# Patient Record
Sex: FEMALE | Race: WHITE | Employment: UNEMPLOYED | URBAN - NONMETROPOLITAN AREA
[De-identification: names, ages, dates, MRNs, and addresses within clinical notes are randomized per-mention and may not be internally consistent; named-entity substitution may affect disease eponyms.]

---

## 2023-07-27 ENCOUNTER — OFFICE VISIT (OUTPATIENT)
Dept: ONCOLOGY | Age: 23
End: 2023-07-27
Payer: COMMERCIAL

## 2023-07-27 ENCOUNTER — HOSPITAL ENCOUNTER (OUTPATIENT)
Dept: INFUSION THERAPY | Age: 23
Discharge: HOME | End: 2023-07-27
Payer: COMMERCIAL

## 2023-07-27 VITALS
HEART RATE: 60 BPM | OXYGEN SATURATION: 100 % | TEMPERATURE: 99.1 F | SYSTOLIC BLOOD PRESSURE: 110 MMHG | DIASTOLIC BLOOD PRESSURE: 68 MMHG | RESPIRATION RATE: 16 BRPM

## 2023-07-27 VITALS
TEMPERATURE: 99.1 F | HEART RATE: 60 BPM | BODY MASS INDEX: 26.55 KG/M2 | OXYGEN SATURATION: 100 % | HEIGHT: 61 IN | WEIGHT: 140.6 LBS | SYSTOLIC BLOOD PRESSURE: 110 MMHG | RESPIRATION RATE: 16 BRPM | DIASTOLIC BLOOD PRESSURE: 68 MMHG

## 2023-07-27 DIAGNOSIS — C73 FOLLICULAR CANCER OF THYROID (HCC): Primary | ICD-10-CM

## 2023-07-27 DIAGNOSIS — E03.2 IATROGENIC HYPOTHYROIDISM: ICD-10-CM

## 2023-07-27 PROCEDURE — 99204 OFFICE O/P NEW MOD 45 MIN: CPT | Performed by: INTERNAL MEDICINE

## 2023-07-27 PROCEDURE — G8419 CALC BMI OUT NRM PARAM NOF/U: HCPCS | Performed by: INTERNAL MEDICINE

## 2023-07-27 PROCEDURE — 1036F TOBACCO NON-USER: CPT | Performed by: INTERNAL MEDICINE

## 2023-07-27 PROCEDURE — G8427 DOCREV CUR MEDS BY ELIG CLIN: HCPCS | Performed by: INTERNAL MEDICINE

## 2023-07-27 PROCEDURE — 99211 OFF/OP EST MAY X REQ PHY/QHP: CPT

## 2023-07-27 RX ORDER — LEVOTHYROXINE SODIUM 0.1 MG/1
100 TABLET ORAL
COMMUNITY
Start: 2023-07-11

## 2023-07-27 ASSESSMENT — ENCOUNTER SYMPTOMS
APNEA: 0
COUGH: 0
CHOKING: 0
FACIAL SWELLING: 0
ANAL BLEEDING: 0
SHORTNESS OF BREATH: 0
BACK PAIN: 0
VOMITING: 0
STRIDOR: 0
EYE DISCHARGE: 0
ABDOMINAL PAIN: 0
RECTAL PAIN: 0
COLOR CHANGE: 0
CONSTIPATION: 0
CHEST TIGHTNESS: 0
TROUBLE SWALLOWING: 0
WHEEZING: 0
ABDOMINAL DISTENTION: 0
EYE PAIN: 0
SINUS PAIN: 0
BLOOD IN STOOL: 0
NAUSEA: 0
DIARRHEA: 0

## 2023-07-27 NOTE — PATIENT INSTRUCTIONS
ASSESSMENT/PLAN:    1: Diagnosis:       Thyroid Cancer s/p Lobectomy in 2021       Iatrogenic Hypothyroidism      2) Treatment goal:      Treatment plan:      LAB THIS MONTH      Thyroglobulin       Thyroglobulin antibody      TSH every 3 months      Ultra-sound of the neck-s/p thyroidectomy      Levo-thyroxine 100 mcg daily        3) Follow Up:Office appointment 3 months with Minor TABITHA Ledesma with TSH

## 2023-07-27 NOTE — PROGRESS NOTES
[address] [facility] [address]  Dept: [phone]  Loc: [phone]   Hematology/Oncology Consult (Clinic)        7/27/23     Ramos Reason   2000     Generic, [address]       Reason:   Chief Complaint   Patient presents with    New Patient     Thyroid Cancer          HPI:   The patient is a 72-year-old woman who moved to [facility] from the [facility] in the [facility] approximately 4 months ago. She first had surgery on her thyroid gland in March 2021 in [facility] in the [facility]. 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 She then had a second surgery done in [place] with radioactive iodine administration in July 2021. Since then she has been maintained on levothyroxine (Synthroid) 100 mcg daily. Fortunately she ran out of thyroid medication approximately 3 months ago and has been off thyroid replacement therapy until approximately 2 weeks ago. During this time her TSH increased to 140. Patient has a negative review of systems except for a recent 10 pound weight gain since stopping thyroid replacement therapy. She also has had some menorrhagia since stopping thyroid replacement therapy. She is here today to establish care and follow-up for her follicular carcinoma of the thyroid gland, status post complete thyroidectomy and radioactive iodine administration in July 2021. ROS:  Review of Systems   Constitutional:  Positive for unexpected weight change. Negative for appetite change, chills, diaphoresis, fatigue and fever. Patient has gained 10 pounds since running out of thyroid medication 3 months ago   HENT:  Negative for drooling, facial swelling, mouth sores, nosebleeds, sinus pain and trouble swallowing. Eyes:  Negative for pain, discharge and visual disturbance.    Respiratory:  Negative

## 2023-07-31 ENCOUNTER — SOCIAL WORK (OUTPATIENT)
Dept: INFUSION THERAPY | Age: 23
End: 2023-07-31

## 2023-08-01 NOTE — PROGRESS NOTES
Oncology Social Work    Date: 8/1/2023  Time: 11:14 AM  Name: Niall Mayo  MRN: [id number]     Contact Type: Follow-up    Note:   Situation: This staff called Niall Mayo via phone support to introduce myself as her Oncology Social Worker. Background:  Carlene Mayo had been provided a Distress Thermometer at her initial appointment at the [facility]. This staff was calling to establish results of her distress levels. Assessment: Although there were no reflected elevated concerns indicated on the Distress Thermometer other than fatigue and some bone pain, she did share her appreciation for my contact. - She shared that she continues to work full-time from [employer] daily and is unable to talk on her phone during that time.  - Education regarding the services provided by the SW were relayed on the message.  - A referral to the [facility] [address] was provided should she choose that option of support as well. Recommendation: Follow-up will be initiated by Carlenelaura Tejedajovan based on need.  provided her with my contact information and will remain available for support.         WILTON Bryant, DIANE, MARIO  Oncology Social Worker      Electronically signed by WILTON Bryant LSW, ACHP-SW on 8/1/2023 at 11:14 AM

## 2023-11-13 ENCOUNTER — OFFICE VISIT (OUTPATIENT)
Dept: ONCOLOGY | Age: 23
End: 2023-11-13
Payer: COMMERCIAL

## 2023-11-13 ENCOUNTER — HOSPITAL ENCOUNTER (OUTPATIENT)
Dept: INFUSION THERAPY | Age: 23
Discharge: HOME | End: 2023-11-13
Payer: COMMERCIAL

## 2023-11-13 VITALS
SYSTOLIC BLOOD PRESSURE: 113 MMHG | RESPIRATION RATE: 18 BRPM | HEIGHT: 61 IN | DIASTOLIC BLOOD PRESSURE: 67 MMHG | OXYGEN SATURATION: 100 % | HEART RATE: 67 BPM | BODY MASS INDEX: 27.83 KG/M2 | TEMPERATURE: 98.3 F | WEIGHT: 147.4 LBS

## 2023-11-13 VITALS
RESPIRATION RATE: 18 BRPM | TEMPERATURE: 98.3 F | HEART RATE: 67 BPM | SYSTOLIC BLOOD PRESSURE: 113 MMHG | OXYGEN SATURATION: 100 % | DIASTOLIC BLOOD PRESSURE: 67 MMHG

## 2023-11-13 DIAGNOSIS — C73 FOLLICULAR CANCER OF THYROID (HCC): Primary | ICD-10-CM

## 2023-11-13 DIAGNOSIS — D50.0 IRON DEFICIENCY ANEMIA DUE TO CHRONIC BLOOD LOSS: ICD-10-CM

## 2023-11-13 DIAGNOSIS — C73 FOLLICULAR CANCER OF THYROID (HCC): ICD-10-CM

## 2023-11-13 LAB
ALBUMIN SERPL BCG-MCNC: 4.4 G/DL (ref 3.5–5.1)
ALP SERPL-CCNC: 85 U/L (ref 38–126)
ALT SERPL W/O P-5'-P-CCNC: 22 U/L (ref 11–66)
AST SERPL-CCNC: 23 U/L (ref 5–40)
BASOPHILS # BLD AUTO: 0 THOU/MM3 (ref 0–0.1)
BASOPHILS NFR BLD AUTO: 1 % (ref 0–3)
BILIRUB CONJ SERPL-MCNC: < 0.2 MG/DL (ref 0–0.3)
BILIRUB SERPL-MCNC: 0.2 MG/DL (ref 0.3–1.2)
BUN BLDP-MCNC: 9 MG/DL (ref 8–26)
CHLORIDE BLD-SCNC: 106 MEQ/L (ref 98–109)
CREAT BLD-MCNC: 0.9 MG/DL (ref 0.5–1.2)
EOSINOPHIL # BLD AUTO: 0.2 THOU/MM3 (ref 0–0.4)
EOSINOPHIL NFR BLD AUTO: 4 % (ref 0–4)
ERYTHROCYTE [DISTWIDTH] IN BLOOD BY AUTOMATED COUNT: 13.2 % (ref 11.5–14.5)
GFR SERPL CREATININE-BSD FRML MDRD: > 60 ML/MIN/1.73M2
GLUCOSE BLD-MCNC: 69 MG/DL (ref 70–108)
HCT VFR BLD AUTO: 32 % (ref 37–47)
HGB BLD-MCNC: 10.3 GM/DL (ref 12–16)
IMM GRANULOCYTES # BLD AUTO: 0.03 THOU/MM3 (ref 0–0.07)
IMM GRANULOCYTES NFR BLD AUTO: 1 %
IONIZED CALCIUM, WHOLE BLOOD: 1.17 MMOL/L (ref 1.12–1.32)
LYMPHOCYTES # BLD AUTO: 2.2 THOU/MM3 (ref 1–4.8)
LYMPHOCYTES NFR BLD AUTO: 34 % (ref 15–47)
MCH RBC QN AUTO: 28.1 PG (ref 26–33)
MCHC RBC AUTO-ENTMCNC: 32.2 GM/DL (ref 32.2–35.5)
MCV RBC AUTO: 87 FL (ref 81–99)
MONOCYTES # BLD AUTO: 0.3 THOU/MM3 (ref 0.4–1.3)
MONOCYTES NFR BLD AUTO: 4 % (ref 0–12)
NEUTROPHILS NFR BLD AUTO: 57 % (ref 43–75)
PLATELET # BLD AUTO: 350 THOU/MM3 (ref 130–400)
PMV BLD AUTO: 8.9 FL (ref 9.4–12.4)
POTASSIUM BLD-SCNC: 3.8 MEQ/L (ref 3.5–4.9)
PROT SERPL-MCNC: 8.2 G/DL (ref 6.1–8)
RBC # BLD AUTO: 3.66 MILL/MM3 (ref 4.2–5.4)
SEGMENTED NEUTROPHILS ABSOLUTE COUNT: 3.7 THOU/MM3 (ref 1.8–7.7)
SODIUM BLD-SCNC: 141 MEQ/L (ref 138–146)
TOTAL CO2, WHOLE BLOOD: 25 MEQ/L (ref 23–33)
TSH SERPL DL<=0.005 MIU/L-ACNC: 118.7 UIU/ML (ref 0.4–4.2)
WBC # BLD AUTO: 6.5 THOU/MM3 (ref 4.8–10.8)

## 2023-11-13 PROCEDURE — 99211 OFF/OP EST MAY X REQ PHY/QHP: CPT

## 2023-11-13 PROCEDURE — 84443 ASSAY THYROID STIM HORMONE: CPT

## 2023-11-13 PROCEDURE — 85025 COMPLETE CBC W/AUTO DIFF WBC: CPT

## 2023-11-13 PROCEDURE — 84439 ASSAY OF FREE THYROXINE: CPT

## 2023-11-13 PROCEDURE — 83540 ASSAY OF IRON: CPT

## 2023-11-13 PROCEDURE — 80047 BASIC METABLC PNL IONIZED CA: CPT

## 2023-11-13 PROCEDURE — 82728 ASSAY OF FERRITIN: CPT

## 2023-11-13 PROCEDURE — 86800 THYROGLOBULIN ANTIBODY: CPT

## 2023-11-13 PROCEDURE — 83550 IRON BINDING TEST: CPT

## 2023-11-13 PROCEDURE — 80076 HEPATIC FUNCTION PANEL: CPT

## 2023-11-13 PROCEDURE — 99214 OFFICE O/P EST MOD 30 MIN: CPT | Performed by: NURSE PRACTITIONER

## 2023-11-13 RX ORDER — LEVOTHYROXINE SODIUM 0.1 MG/1
100 TABLET ORAL
Qty: 30 TABLET | Refills: 5 | Status: SHIPPED | OUTPATIENT
Start: 2023-11-13

## 2023-11-13 NOTE — PROGRESS NOTES
[address] [facility] [address]  Dept: [phone]  Loc: [phone]       Visit Date:11/13/2023     Nahomi Baez is a 26 y.o. female who presents today for:   Chief Complaint   Patient presents with    Follow-up     Follicular cancer of thyroid        HPI:   Nahomi Baez is a 26 y.o. female with thyroid carcinoma. The patient initially underwent biopsy of the left lobe of the thyroid gland March 2021 in [facility] in the [facility]. Pathology results confirmed follicular thyroid carcinoma. July 2021 she underwent thyroidectomy with radioactive iodine administration in [place]. She was started on treatment with Levothyroxine 125 mcg by mouth daily. 07/10/2023 TSH level 140 The patient was previously prescribed treatment with Synthroid (levothyroxine) 125 mcg by mouth daily, but it was noted she ran out of her medication for approximately 3 months. 07/27/2023 the patient was seen for initial evaluation with Dr. Jon Wilks. She was started on treatment with Levothyroxine 100 mcg by mouth daily. 09/01/2023 TSH level 98 and free T4 low at 0.38 (0.78 - 2.19)          Interval History 11/13/2023: The patient returns for follow-up on her thyroid cancer. Her current thyroid profile is pending, results will be followed. She is currently on treatment with levothyroxine 100 mcg by mouth daily. The patient reports her menstrual cycle is approximately every 28-30 days. Last month she had only spotting, but this month returned to her normal.  She denies any headaches, dizziness or vision changes. No chest pain or heart palpitations. Iron study results reveal iron level 42, TIBC 385, iron saturation 11% and ferritin level 19. GFR, BMP and hepatic function panel within normal limits. WBC 6.5, Hgb 10.3, HCT 32%, MCV 87, RDW 13.2% and platelet count 547,102.   No abdominal pain or GI

## 2023-11-13 NOTE — PATIENT INSTRUCTIONS
Iron studies today, will call results  Return to clinic for labs in 6 weeks  Return to clinic for follow up in 3 months  Notify the office of any new or worsening symptoms

## 2023-11-14 ENCOUNTER — CLINICAL DOCUMENTATION (OUTPATIENT)
Dept: ONCOLOGY | Age: 23
End: 2023-11-14

## 2023-11-14 LAB — T4 FREE SERPL-MCNC: 0.38 NG/DL (ref 0.93–1.76)

## 2023-11-14 NOTE — PROGRESS NOTES
Lab results were discussed with the patient's PCP, Kaila TURCIOS. She confirmed her office will contact the patient regarding compliance with her thyroid medication, follow-up on ultrasound previously ordered and placed a referral to endocrinology.

## 2023-11-15 LAB
FERRITIN SERPL IA-MCNC: 19 NG/ML (ref 10–291)
IRON SATN MFR SERPL: 11 % (ref 20–50)
IRON SERPL-MCNC: 42 UG/DL (ref 50–170)
TIBC SERPL-MCNC: 385 UG/DL (ref 171–450)

## 2023-11-15 RX ORDER — LANOLIN ALCOHOL/MO/W.PET/CERES
325 CREAM (GRAM) TOPICAL 2 TIMES DAILY WITH MEALS
Qty: 30 TABLET | Refills: 3 | Status: SHIPPED | OUTPATIENT
Start: 2023-11-15

## 2023-11-16 LAB — THYROGLOBULIN: NORMAL

## 2023-11-21 ENCOUNTER — TELEPHONE (OUTPATIENT)
Dept: ONCOLOGY | Age: 23
End: 2023-11-21

## 2023-11-21 NOTE — TELEPHONE ENCOUNTER
Received message from Nneka LU:     \"Please notify the patient her iron studies are low and she requires treatment with oral iron 1 tablet by mouth daily. Thanks! Prescription sent to [facility] in [address] [facility]. \"    Left message informing patient.